# Patient Record
Sex: FEMALE
[De-identification: names, ages, dates, MRNs, and addresses within clinical notes are randomized per-mention and may not be internally consistent; named-entity substitution may affect disease eponyms.]

---

## 2020-07-30 ENCOUNTER — HOSPITAL ENCOUNTER (INPATIENT)
Dept: HOSPITAL 90 - EDH | Age: 25
LOS: 2 days | Discharge: HOME | DRG: 833 | End: 2020-08-01
Attending: SPECIALIST | Admitting: SPECIALIST
Payer: MEDICAID

## 2020-07-30 VITALS — BODY MASS INDEX: 35.82 KG/M2 | HEIGHT: 65 IN | WEIGHT: 215 LBS

## 2020-07-30 DIAGNOSIS — Z3A.27: ICD-10-CM

## 2020-07-30 DIAGNOSIS — B96.20: ICD-10-CM

## 2020-07-30 DIAGNOSIS — O23.02: Primary | ICD-10-CM

## 2020-07-30 DIAGNOSIS — Z29.13: ICD-10-CM

## 2020-07-30 PROCEDURE — 87340 HEPATITIS B SURFACE AG IA: CPT

## 2020-07-30 PROCEDURE — 96360 HYDRATION IV INFUSION INIT: CPT

## 2020-07-30 PROCEDURE — 86701 HIV-1ANTIBODY: CPT

## 2020-07-30 PROCEDURE — 85027 COMPLETE CBC AUTOMATED: CPT

## 2020-07-30 PROCEDURE — 87390 HIV-1 AG IA: CPT

## 2020-07-30 PROCEDURE — 36415 COLL VENOUS BLD VENIPUNCTURE: CPT

## 2020-07-30 PROCEDURE — 86592 SYPHILIS TEST NON-TREP QUAL: CPT

## 2020-07-30 PROCEDURE — 85610 PROTHROMBIN TIME: CPT

## 2020-07-30 PROCEDURE — 96361 HYDRATE IV INFUSION ADD-ON: CPT

## 2020-07-30 PROCEDURE — 87186 SC STD MICRODIL/AGAR DIL: CPT

## 2020-07-30 PROCEDURE — 80305 DRUG TEST PRSMV DIR OPT OBS: CPT

## 2020-07-30 PROCEDURE — 87088 URINE BACTERIA CULTURE: CPT

## 2020-07-30 PROCEDURE — 86900 BLOOD TYPING SEROLOGIC ABO: CPT

## 2020-07-30 PROCEDURE — 76805 OB US >/= 14 WKS SNGL FETUS: CPT

## 2020-07-30 PROCEDURE — 76770 US EXAM ABDO BACK WALL COMP: CPT

## 2020-07-30 PROCEDURE — 81001 URINALYSIS AUTO W/SCOPE: CPT

## 2020-07-30 PROCEDURE — 86850 RBC ANTIBODY SCREEN: CPT

## 2020-07-30 PROCEDURE — 86901 BLOOD TYPING SEROLOGIC RH(D): CPT

## 2020-07-30 PROCEDURE — 87077 CULTURE AEROBIC IDENTIFY: CPT

## 2020-07-31 VITALS — DIASTOLIC BLOOD PRESSURE: 59 MMHG | SYSTOLIC BLOOD PRESSURE: 104 MMHG

## 2020-07-31 VITALS — SYSTOLIC BLOOD PRESSURE: 112 MMHG | DIASTOLIC BLOOD PRESSURE: 72 MMHG

## 2020-07-31 VITALS — SYSTOLIC BLOOD PRESSURE: 112 MMHG | DIASTOLIC BLOOD PRESSURE: 68 MMHG

## 2020-07-31 VITALS — SYSTOLIC BLOOD PRESSURE: 117 MMHG | DIASTOLIC BLOOD PRESSURE: 62 MMHG

## 2020-07-31 VITALS — DIASTOLIC BLOOD PRESSURE: 85 MMHG | SYSTOLIC BLOOD PRESSURE: 132 MMHG

## 2020-07-31 VITALS — DIASTOLIC BLOOD PRESSURE: 58 MMHG | SYSTOLIC BLOOD PRESSURE: 102 MMHG

## 2020-07-31 LAB
AMPHETAMINES UR QL SCN: NEGATIVE
APPEARANCE UR: (no result)
BARBITURATES UR QL SCN: NEGATIVE
BENZODIAZ UR QL SCN: NEGATIVE
BZE UR QL SCN: NEGATIVE
CANNABINOIDS UR QL SCN: NEGATIVE
DEPRECATED SQUAMOUS URNS QL MICRO: (no result) /HPF (ref 0–2)
ERYTHROCYTE [DISTWIDTH] IN BLOOD BY AUTOMATED COUNT: 15.2 % (ref 11–15.5)
HCT VFR BLD AUTO: 33.1 % (ref 36–48)
MCH RBC QN AUTO: 26.8 PG (ref 27–33)
MCHC RBC AUTO-ENTMCNC: 32.3 G/DL (ref 32–36)
MCV RBC AUTO: 83 FL (ref 79–99)
NRBC BLD MANUAL-RTO: 0 % (ref 0–0.19)
OPIATES UR QL SCN: NEGATIVE
PCP UR QL SCN: NEGATIVE
PH UR STRIP: 5 [PH] (ref 5–8)
PLATELET # BLD AUTO: 229 K/UL (ref 130–400)
PROT UR QL STRIP: (no result) MG/DL
RBC # BLD AUTO: 3.99 MIL/UL (ref 4–5.5)
RBC #/AREA URNS HPF: (no result) /HPF (ref 0–1)
SP GR UR STRIP: 1.02 (ref 1–1.03)
UROBILINOGEN UR STRIP-MCNC: 1 MG/DL (ref 0.2–1)
WBC # BLD AUTO: 8.8 K/UL (ref 4.8–10.8)
WBC #/AREA URNS HPF: (no result) /HPF (ref 0–1)

## 2020-07-31 RX ADMIN — AMPICILLIN SODIUM SCH MLS/HR: 2 INJECTION, POWDER, FOR SOLUTION INTRAMUSCULAR; INTRAVENOUS at 07:35

## 2020-07-31 RX ADMIN — SODIUM CHLORIDE SCH GM: 9 INJECTION, SOLUTION INTRAVENOUS at 16:25

## 2020-07-31 RX ADMIN — Medication SCH MLS/HR: at 01:05

## 2020-07-31 RX ADMIN — AMPICILLIN SODIUM SCH MLS/HR: 2 INJECTION, POWDER, FOR SOLUTION INTRAMUSCULAR; INTRAVENOUS at 01:30

## 2020-07-31 RX ADMIN — ACETAMINOPHEN PRN MG: 500 TABLET, COATED ORAL at 07:36

## 2020-07-31 RX ADMIN — ACETAMINOPHEN PRN MG: 500 TABLET, COATED ORAL at 12:40

## 2020-07-31 RX ADMIN — AMPICILLIN SODIUM SCH MLS/HR: 2 INJECTION, POWDER, FOR SOLUTION INTRAMUSCULAR; INTRAVENOUS at 13:42

## 2020-07-31 NOTE — NUR
DR. FREEMAN ROUNDED ON PATIENT AND NEW ORDERS GIVEN FOR ANTIBIOTICS.  AMPICILLAN 
DISCONTINUED AND ROCEPHIN 1GM ADDED TO MEDS DAILY.  ONE DOSE TO BE GIVEN TODAY.

## 2020-07-31 NOTE — NUR
[place] tray & 2 apple juices givenhalley n/v at present.

-------------------------------------------------------------------------------

Addendum: 07/31/20 at 2004 by MIRELLA ADRIAN RN RN

-------------------------------------------------------------------------------

Amended: Links added.

## 2020-07-31 NOTE — NUR
ROCEPHIN ADMINISTERED AND PATIENT TOLERATED WELL.  STATES FEELING BETTER AFTER ANTIBIOTICS 
HUNG ON ADMISSION TO FLOOR.

## 2020-07-31 NOTE — NUR
REPORT RECEIVED FROM GABBY LUJAN RN AND PATIENT TRANSFERED VIA BED.  PATIENT C/O PAIN BUT 
WAS MEDICATED WITH TYLENOL PRIOR TO TRANSFER.  PATIENT ORIENTED TO UNIT AND CALL LIGHT LEFT 
AT BEDSIDE TO CALL FOR ASSISTANCE.

## 2020-07-31 NOTE — NUR
Explaining plan for vital signs & fht's via monitor, pt voices understanding & agrees to 
plan.  Fetal heart tones located at right of umbilicus, rate of 140 noted; audible fetal 
movement via monitor & confirmed by pt.  Abd/fundus soft & non tender to palpation.  Pt 
denies pain or discomfort at present, no guarding or rigidity noted.  Pt requesting cereal, 
informing sandwich available now, pt agrees.  

-------------------------------------------------------------------------------

Addendum: 07/31/20 at 2003 by MIRELLA ADRIAN RN RN

-------------------------------------------------------------------------------

Amended: Links added.

## 2020-08-01 VITALS — SYSTOLIC BLOOD PRESSURE: 115 MMHG | DIASTOLIC BLOOD PRESSURE: 57 MMHG

## 2020-08-01 VITALS — DIASTOLIC BLOOD PRESSURE: 68 MMHG | SYSTOLIC BLOOD PRESSURE: 109 MMHG

## 2020-08-01 VITALS — DIASTOLIC BLOOD PRESSURE: 70 MMHG | SYSTOLIC BLOOD PRESSURE: 113 MMHG

## 2020-08-01 VITALS — SYSTOLIC BLOOD PRESSURE: 115 MMHG | DIASTOLIC BLOOD PRESSURE: 69 MMHG

## 2020-08-01 LAB
ERYTHROCYTE [DISTWIDTH] IN BLOOD BY AUTOMATED COUNT: 15 % (ref 11–15.5)
HCT VFR BLD AUTO: 36 % (ref 36–48)
INR PPP: 0.93 (ref 0.85–1.15)
MCH RBC QN AUTO: 26.7 PG (ref 27–33)
MCHC RBC AUTO-ENTMCNC: 32.5 G/DL (ref 32–36)
MCV RBC AUTO: 82.2 FL (ref 79–99)
NRBC BLD MANUAL-RTO: 0 % (ref 0–0.19)
PLATELET # BLD AUTO: 237 K/UL (ref 130–400)
PROTHROMBIN TIME: 10.1 SEC (ref 9.6–11.6)
RBC # BLD AUTO: 4.38 MIL/UL (ref 4–5.5)
RPR SER QL: NONREACTIVE
WBC # BLD AUTO: 9.7 K/UL (ref 4.8–10.8)

## 2020-08-01 PROCEDURE — 3E0234Z INTRODUCTION OF SERUM, TOXOID AND VACCINE INTO MUSCLE, PERCUTANEOUS APPROACH: ICD-10-PCS | Performed by: SPECIALIST

## 2020-08-01 RX ADMIN — SODIUM CHLORIDE SCH GM: 9 INJECTION, SOLUTION INTRAVENOUS at 08:45

## 2020-08-01 RX ADMIN — Medication SCH MLS/HR: at 02:53

## 2020-08-01 RX ADMIN — Medication SCH MLS/HR: at 08:53

## 2020-08-01 NOTE — NUR
cm note

spoke to ania VERAS and dr Gann, re plan for IV antibiotic needed X 7 days, per ANNIA Kim RNCM Director. approved for pt to return back to [facility] at 8am tomorrow. faxed info to 
HS, pharmacy and er registration. instructed pt to return back tomorrow at 745am. to ED at 
[facility] to start her OP iv antibiotic. pt verbalizes understanding, copy of order given to pt. 
and instructed to bring order tomorrow.

## 2020-08-01 NOTE — NUR
PATIENT ASSESSED AND DENIES ANY FURTHER FLANK PAIN OF PAIN IN GENERAL.  STATES FEELING MUCH 
BETTER AND WONDERED IF GOING HOME.  C&S RECEIVED AND WBC WNL.  PIV INFUSING AT 200CC/HR.  
PATIENT STATES HAS BEEN URINATING A LOT.  500CC OF PALE YELLOW CLEAR URINE DISCARDED.  FETAL 
HEART TONES OBTAINED BY DOPPLER AND WERE 156 TO RIGHT MIDDLE QUADRANT.

## 2020-08-01 NOTE — NUR
DR. FREEMAN ROUNDED AND ORDER GIVEN FOR PATIENT TO BE DISCHARGED BUT CASEMANAGER IS TO MAKE 
ARRANGEMENTS FOR PATIENT TO CONTINUE ROCEPHIN IV ON AN OUTPATIENT BASIS.  PATIENT AGREED AND 
IS SCHEDULED TO COME IN AT 0745 ON 8/2/20 FOR PICC LINE INSERTION AND RECEIVE DOSE OF 
ROCEPHIN DAILY FOR 7 MORE DAYS.

## 2020-08-01 NOTE — NUR
PATIENT RESTING QUIETLY AND DISCARDED AT THIS TIME 500CC OF PALE YELLOW URINE FROM URINAL.  
PATIENT RESTING WELL.  NO DISTRESS NOTED AT THIS TIME.

## 2020-08-01 NOTE — NUR
DISCHARGE INSTRUCTIONS GIVEN AND INFO ON PICC LINE GIVEN.  2ND AND 3RD TRIMESTER INFO ALSO 
PROVIDED.  PATIENT VERBALIZED UNDERSTANDING INSTRUCTIONS GIVEN AND INDICATED THAT WOULD 
PROBABLY CONTINUE FOLLOW UP CARE WITH DR. FREEMAN.

## 2020-08-01 NOTE — NUR
ADE IN TO SEE PATIENT AND GAVE PATIENT INSTRUCTIONS ON FOLLOW UP IN A.M. IN ER.  ENVELOPE 
WITH INSTRUCTIONS AND SCRIPT FOR ROCEPHIN GIVEN.  PATIENT VERBALIZED UNDERSTANDING 
INSTRUCTIONS GIVEN.

## 2020-08-02 ENCOUNTER — HOSPITAL ENCOUNTER (OUTPATIENT)
Dept: HOSPITAL 90 - LAB | Age: 25
Discharge: HOME | End: 2020-08-02
Attending: SPECIALIST
Payer: COMMERCIAL

## 2020-08-02 DIAGNOSIS — N12: Primary | ICD-10-CM

## 2020-08-02 PROCEDURE — 36569 INSJ PICC 5 YR+ W/O IMAGING: CPT

## 2020-10-01 ENCOUNTER — HOSPITAL ENCOUNTER (OUTPATIENT)
Dept: HOSPITAL 90 - EDH | Age: 25
Setting detail: OBSERVATION
Discharge: HOME | End: 2020-10-01
Attending: OBSTETRICS & GYNECOLOGY | Admitting: OBSTETRICS & GYNECOLOGY
Payer: MEDICAID

## 2020-10-01 DIAGNOSIS — Z3A.36: ICD-10-CM

## 2020-10-01 DIAGNOSIS — O26.893: Primary | ICD-10-CM

## 2020-10-01 DIAGNOSIS — R10.9: ICD-10-CM

## 2020-10-01 LAB
AMPHETAMINES UR QL SCN: NEGATIVE
BARBITURATES UR QL SCN: NEGATIVE
BENZODIAZ UR QL SCN: NEGATIVE
BZE UR QL SCN: POSITIVE
CANNABINOIDS UR QL SCN: NEGATIVE
DEPRECATED SQUAMOUS URNS QL MICRO: (no result) /HPF (ref 0–2)
OPIATES UR QL SCN: NEGATIVE
PCP UR QL SCN: NEGATIVE
PH UR STRIP: 5 [PH] (ref 5–8)
SP GR UR STRIP: 1.01 (ref 1–1.03)
UROBILINOGEN UR STRIP-MCNC: 0.2 MG/DL (ref 0.2–1)
WBC #/AREA URNS HPF: (no result) /HPF (ref 0–1)

## 2020-10-01 PROCEDURE — 87186 SC STD MICRODIL/AGAR DIL: CPT

## 2020-10-01 PROCEDURE — 96361 HYDRATE IV INFUSION ADD-ON: CPT

## 2020-10-01 PROCEDURE — 96360 HYDRATION IV INFUSION INIT: CPT

## 2020-10-01 PROCEDURE — 87088 URINE BACTERIA CULTURE: CPT

## 2020-10-01 PROCEDURE — 80305 DRUG TEST PRSMV DIR OPT OBS: CPT

## 2020-10-01 PROCEDURE — 81001 URINALYSIS AUTO W/SCOPE: CPT

## 2020-10-01 PROCEDURE — 99284 EMERGENCY DEPT VISIT MOD MDM: CPT

## 2020-10-01 PROCEDURE — 87077 CULTURE AEROBIC IDENTIFY: CPT

## 2020-10-18 ENCOUNTER — HOSPITAL ENCOUNTER (OUTPATIENT)
Dept: HOSPITAL 90 - EDH | Age: 25
Setting detail: OBSERVATION
Discharge: HOME | End: 2020-10-18
Attending: OBSTETRICS & GYNECOLOGY | Admitting: OBSTETRICS & GYNECOLOGY
Payer: COMMERCIAL

## 2020-10-18 VITALS — DIASTOLIC BLOOD PRESSURE: 68 MMHG | SYSTOLIC BLOOD PRESSURE: 107 MMHG

## 2020-10-18 VITALS — WEIGHT: 221 LBS | BODY MASS INDEX: 35.52 KG/M2 | HEIGHT: 66 IN

## 2020-10-18 DIAGNOSIS — O47.1: Primary | ICD-10-CM

## 2020-10-18 DIAGNOSIS — O46.93: ICD-10-CM

## 2020-10-18 DIAGNOSIS — Z3A.38: ICD-10-CM

## 2020-10-18 DIAGNOSIS — O34.219: ICD-10-CM

## 2020-10-18 LAB
AMPHETAMINES UR QL SCN: NEGATIVE
APPEARANCE UR: (no result)
BARBITURATES UR QL SCN: NEGATIVE
BENZODIAZ UR QL SCN: NEGATIVE
BZE UR QL SCN: NEGATIVE
CANNABINOIDS UR QL SCN: NEGATIVE
ERYTHROCYTE [DISTWIDTH] IN BLOOD BY AUTOMATED COUNT: 15.6 % (ref 11–15.5)
HCT VFR BLD AUTO: 35.7 % (ref 36–48)
MCH RBC QN AUTO: 24.2 PG (ref 27–33)
MCHC RBC AUTO-ENTMCNC: 31.1 G/DL (ref 32–36)
MCV RBC AUTO: 77.8 FL (ref 79–99)
NRBC BLD MANUAL-RTO: 0 % (ref 0–0.19)
OPIATES UR QL SCN: NEGATIVE
PCP UR QL SCN: NEGATIVE
PH UR STRIP: 5 [PH] (ref 5–8)
PLATELET # BLD AUTO: 262 K/UL (ref 130–400)
RBC # BLD AUTO: 4.59 MIL/UL (ref 4–5.5)
RBC #/AREA URNS HPF: (no result) /HPF (ref 0–1)
RBC MORPH BLD: (no result)
SP GR UR STRIP: 1.02 (ref 1–1.03)
UROBILINOGEN UR STRIP-MCNC: 1 MG/DL (ref 0.2–1)
WBC # BLD AUTO: 8.8 K/UL (ref 4.8–10.8)
WBC #/AREA URNS HPF: (no result) /HPF (ref 0–1)

## 2020-10-18 PROCEDURE — 87088 URINE BACTERIA CULTURE: CPT

## 2020-10-18 PROCEDURE — 36415 COLL VENOUS BLD VENIPUNCTURE: CPT

## 2020-10-18 PROCEDURE — 81001 URINALYSIS AUTO W/SCOPE: CPT

## 2020-10-18 PROCEDURE — 86850 RBC ANTIBODY SCREEN: CPT

## 2020-10-18 PROCEDURE — 99284 EMERGENCY DEPT VISIT MOD MDM: CPT

## 2020-10-18 PROCEDURE — 87186 SC STD MICRODIL/AGAR DIL: CPT

## 2020-10-18 PROCEDURE — 76805 OB US >/= 14 WKS SNGL FETUS: CPT

## 2020-10-18 PROCEDURE — 96361 HYDRATE IV INFUSION ADD-ON: CPT

## 2020-10-18 PROCEDURE — 87340 HEPATITIS B SURFACE AG IA: CPT

## 2020-10-18 PROCEDURE — 80305 DRUG TEST PRSMV DIR OPT OBS: CPT

## 2020-10-18 PROCEDURE — 96360 HYDRATION IV INFUSION INIT: CPT

## 2020-10-18 PROCEDURE — 87077 CULTURE AEROBIC IDENTIFY: CPT

## 2020-10-18 PROCEDURE — 86901 BLOOD TYPING SEROLOGIC RH(D): CPT

## 2020-10-18 PROCEDURE — 86900 BLOOD TYPING SEROLOGIC ABO: CPT

## 2020-10-18 PROCEDURE — 59025 FETAL NON-STRESS TEST: CPT

## 2020-10-18 PROCEDURE — 85027 COMPLETE CBC AUTOMATED: CPT

## 2020-10-18 PROCEDURE — 86592 SYPHILIS TEST NON-TREP QUAL: CPT

## 2020-10-18 NOTE — NUR
CM NOTE/SS VISIT



CM spoke to patient regarding  referral. Nurse notified CM that patient 
asking for police department phone number. Patient states she has a 3 year old, 1 year old, 
and currently expecting. Reports she was asking for police assistance to escort her while 
taking the children to their father so he can watch children since she is having 
contractions and may possibly deliver soon. States she recently filed a child support case 
and wanted the extra security in case father of children reacts negatively. Denies any abuse 
and states she is not fearful. Reports home situation is stable environment. CM provided 
community resources and also advised to call 211 for further resources. Patient verbalized 
understanding. CM updated nursing.

## 2020-10-19 ENCOUNTER — HOSPITAL ENCOUNTER (INPATIENT)
Dept: HOSPITAL 90 - EDH | Age: 25
LOS: 1 days | Discharge: HOME | End: 2020-10-20
Attending: OBSTETRICS & GYNECOLOGY | Admitting: OBSTETRICS & GYNECOLOGY
Payer: COMMERCIAL

## 2020-10-19 VITALS — DIASTOLIC BLOOD PRESSURE: 71 MMHG | SYSTOLIC BLOOD PRESSURE: 111 MMHG

## 2020-10-19 VITALS — WEIGHT: 220 LBS | BODY MASS INDEX: 36.65 KG/M2 | HEIGHT: 65 IN

## 2020-10-19 VITALS — SYSTOLIC BLOOD PRESSURE: 108 MMHG | DIASTOLIC BLOOD PRESSURE: 78 MMHG

## 2020-10-19 VITALS — SYSTOLIC BLOOD PRESSURE: 130 MMHG | DIASTOLIC BLOOD PRESSURE: 69 MMHG

## 2020-10-19 DIAGNOSIS — O26.893: ICD-10-CM

## 2020-10-19 DIAGNOSIS — Z3A.38: ICD-10-CM

## 2020-10-19 DIAGNOSIS — O34.211: Primary | ICD-10-CM

## 2020-10-19 DIAGNOSIS — Z67.31: ICD-10-CM

## 2020-10-19 DIAGNOSIS — Z23: ICD-10-CM

## 2020-10-19 LAB
AMPHETAMINES UR QL SCN: NEGATIVE
BARBITURATES UR QL SCN: NEGATIVE
BENZODIAZ UR QL SCN: NEGATIVE
BZE UR QL SCN: NEGATIVE
CANNABINOIDS UR QL SCN: NEGATIVE
HGB UR QL STRIP: (no result)
OPIATES UR QL SCN: NEGATIVE
PCP UR QL SCN: NEGATIVE
PH UR STRIP: 7 [PH] (ref 5–8)
SP GR UR STRIP: 1.01 (ref 1–1.03)
UROBILINOGEN UR STRIP-MCNC: 1 MG/DL (ref 0.2–1)
WBC #/AREA URNS HPF: (no result) /HPF (ref 0–1)

## 2020-10-19 PROCEDURE — 86923 COMPATIBILITY TEST ELECTRIC: CPT

## 2020-10-19 PROCEDURE — 59510 CESAREAN DELIVERY: CPT

## 2020-10-19 PROCEDURE — 86900 BLOOD TYPING SEROLOGIC ABO: CPT

## 2020-10-19 PROCEDURE — 3E0234Z INTRODUCTION OF SERUM, TOXOID AND VACCINE INTO MUSCLE, PERCUTANEOUS APPROACH: ICD-10-PCS | Performed by: OBSTETRICS & GYNECOLOGY

## 2020-10-19 PROCEDURE — 86701 HIV-1ANTIBODY: CPT

## 2020-10-19 PROCEDURE — 86901 BLOOD TYPING SEROLOGIC RH(D): CPT

## 2020-10-19 PROCEDURE — 3E0134Z INTRODUCTION OF SERUM, TOXOID AND VACCINE INTO SUBCUTANEOUS TISSUE, PERCUTANEOUS APPROACH: ICD-10-PCS | Performed by: OBSTETRICS & GYNECOLOGY

## 2020-10-19 PROCEDURE — 85027 COMPLETE CBC AUTOMATED: CPT

## 2020-10-19 PROCEDURE — 87340 HEPATITIS B SURFACE AG IA: CPT

## 2020-10-19 PROCEDURE — 86850 RBC ANTIBODY SCREEN: CPT

## 2020-10-19 PROCEDURE — 80305 DRUG TEST PRSMV DIR OPT OBS: CPT

## 2020-10-19 PROCEDURE — 3E02340 INTRODUCTION OF INFLUENZA VACCINE INTO MUSCLE, PERCUTANEOUS APPROACH: ICD-10-PCS | Performed by: OBSTETRICS & GYNECOLOGY

## 2020-10-19 PROCEDURE — 90715 TDAP VACCINE 7 YRS/> IM: CPT

## 2020-10-19 PROCEDURE — 36415 COLL VENOUS BLD VENIPUNCTURE: CPT

## 2020-10-19 PROCEDURE — 90707 MMR VACCINE SC: CPT

## 2020-10-19 PROCEDURE — 87390 HIV-1 AG IA: CPT

## 2020-10-19 RX ADMIN — Medication PRN ML: at 04:01

## 2020-10-19 RX ADMIN — Medication PRN ML: at 04:28

## 2020-10-19 NOTE — NUR
PATIENT;

Patient received with IV of LR with 20 units Pitocin infusing at 150 ml/hr. Talley Catheter 
intact flowing clear yellow urine. Plan of care discussed with patient verbalizes 
understanding.

Patient wants to visit her baby in the nursery. Darby care done, abdominal binder applied to 
abdomen. At 2005 patient brought to Nursery to visit her baby via wheelchair.

## 2020-10-19 NOTE — NUR
Patient; 

Patient brought back to her room via wheelchair on stable condition.She claimed, " I'm wet 
in [place]."  Darby care done with scant Ravina Rubra.

## 2020-10-20 VITALS — DIASTOLIC BLOOD PRESSURE: 59 MMHG | SYSTOLIC BLOOD PRESSURE: 106 MMHG

## 2020-10-20 VITALS — DIASTOLIC BLOOD PRESSURE: 75 MMHG | SYSTOLIC BLOOD PRESSURE: 121 MMHG

## 2020-10-20 VITALS — SYSTOLIC BLOOD PRESSURE: 109 MMHG | DIASTOLIC BLOOD PRESSURE: 65 MMHG

## 2020-10-20 LAB
ERYTHROCYTE [DISTWIDTH] IN BLOOD BY AUTOMATED COUNT: 15.7 % (ref 11–15.5)
HCT VFR BLD AUTO: 33.4 % (ref 36–48)
MCH RBC QN AUTO: 24 PG (ref 27–33)
MCHC RBC AUTO-ENTMCNC: 30.8 G/DL (ref 32–36)
MCV RBC AUTO: 77.7 FL (ref 79–99)
NRBC BLD MANUAL-RTO: 0 % (ref 0–0.19)
PLATELET # BLD AUTO: 204 K/UL (ref 130–400)
RBC # BLD AUTO: 4.3 MIL/UL (ref 4–5.5)
WBC # BLD AUTO: 10.3 K/UL (ref 4.8–10.8)

## 2020-10-20 RX ADMIN — PROMETHAZINE HYDROCHLORIDE PRN MG: 25 INJECTION INTRAMUSCULAR; INTRAVENOUS at 05:04

## 2020-10-20 RX ADMIN — DEXTROSE AND SODIUM CHLORIDE PRN MLS/HR: 5; .45 INJECTION, SOLUTION INTRAVENOUS at 07:03

## 2020-10-20 RX ADMIN — MEPERIDINE HYDROCHLORIDE PRN MG: 75 INJECTION, SOLUTION INTRAMUSCULAR; INTRAVENOUS; SUBCUTANEOUS at 05:06

## 2020-10-20 RX ADMIN — DEXTROSE AND SODIUM CHLORIDE PRN MLS/HR: 5; .45 INJECTION, SOLUTION INTRAVENOUS at 00:29

## 2020-10-20 RX ADMIN — MEPERIDINE HYDROCHLORIDE PRN MG: 75 INJECTION, SOLUTION INTRAMUSCULAR; INTRAVENOUS; SUBCUTANEOUS at 00:27

## 2020-10-20 RX ADMIN — PROMETHAZINE HYDROCHLORIDE PRN MG: 25 INJECTION INTRAMUSCULAR; INTRAVENOUS at 00:26
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
## 2020-10-20 NOTE — NUR
Talley;

Talley Catheter discontinued patient tolerated it well. Advice to call for help if needed to 
go to the bathroom the first time. She verbalizes understanding.

## 2020-10-20 NOTE — NUR
CPS-Call from Amrita JENKINS/ [phone] that she will make site visit.

1315-Amrita/CPS on site to meet with Pt and .

## 2020-10-20 NOTE — NUR
DISCHARGE

PT AMBULATED OUT OF HOSPITAL, ACCOMPANIED BY CPS WORKER. PT DENIED PAIN AND HAD NO 
COMPLAINTS AND REFUSED USE OF WHEELCHAIR. BABY TO STAY IN NURSERY FOR CONTINUITY OF CARE. PT 
TRANSPORTED HOME BY CPS WORKER.

## 2020-10-21 NOTE — NUR
SW met with BG's mother in Nursery, mother reported that she is doing well. SW spoke 
w/mother about [facility] referral upon d/c, mother verbalized an understanding and consent signed. 
SW requested copy of Safety Plan and per mother, she does not have it with her at this time 
but will provide a copy to nursing on her next visit tonight.

## 2020-10-21 NOTE — NUR
CPS-Spoke with Amrita Rodriguez/CPS f/u- As of this time with Meconium pending, baby may be 
released to birthmother when medically cleared. CPS may make changes if Meconium results 
returned are positive.

## 2020-10-22 NOTE — NUR
F/U call to BG's mother who reported that she was unable to visit last night due to lack of 
transportation and was concerned because she still had no transportation to visit this 
morning.

SW explained to mother that it was alright if she is unable to visit and encouraged her to 
notify nursery.

After a discussion with mother about Safety Plan, it turns out that she does not have a copy 
yet from CPS as it is lacking a signature. SW encouraged mother to present copy once it was 
completed and provided to her by CPS. Mother questioning baby's d/c on Saturday and this 
worker explained to her again reason for continued stay re-scoring. 

SW will continue to follow.

## 2020-10-22 NOTE — NUR
CPS-Call from Amrita/[facility] to inform this worker that Safety Plan to be completed this evening 
and that she will email to this worker; Amrita updated on conversation with BG's mother.